# Patient Record
Sex: MALE | Race: WHITE | Employment: FULL TIME | URBAN - METROPOLITAN AREA
[De-identification: names, ages, dates, MRNs, and addresses within clinical notes are randomized per-mention and may not be internally consistent; named-entity substitution may affect disease eponyms.]

---

## 2019-01-05 ENCOUNTER — HOSPITAL ENCOUNTER (EMERGENCY)
Age: 29
Discharge: HOME | End: 2019-01-05
Payer: COMMERCIAL

## 2019-01-05 VITALS
OXYGEN SATURATION: 97 % | RESPIRATION RATE: 16 BRPM | SYSTOLIC BLOOD PRESSURE: 117 MMHG | DIASTOLIC BLOOD PRESSURE: 74 MMHG | HEART RATE: 73 BPM | TEMPERATURE: 98.6 F | WEIGHT: 180 LBS

## 2019-01-05 DIAGNOSIS — J02.9 ACUTE PHARYNGITIS, UNSPECIFIED ETIOLOGY: Primary | ICD-10-CM

## 2019-01-05 PROCEDURE — 99212 OFFICE O/P EST SF 10 MIN: CPT

## 2019-01-05 RX ORDER — AMOXICILLIN 500 MG/1
500 CAPSULE ORAL 3 TIMES DAILY
Qty: 30 CAPSULE | Refills: 0 | Status: SHIPPED | OUTPATIENT
Start: 2019-01-05 | End: 2019-01-15

## 2019-09-23 ENCOUNTER — HOSPITAL ENCOUNTER (EMERGENCY)
Age: 29
Discharge: HOME | End: 2019-09-23
Payer: COMMERCIAL

## 2019-09-23 VITALS
RESPIRATION RATE: 18 BRPM | TEMPERATURE: 98.5 F | BODY MASS INDEX: 24.52 KG/M2 | WEIGHT: 185 LBS | SYSTOLIC BLOOD PRESSURE: 122 MMHG | OXYGEN SATURATION: 98 % | HEART RATE: 61 BPM | DIASTOLIC BLOOD PRESSURE: 78 MMHG | HEIGHT: 73 IN

## 2019-09-23 DIAGNOSIS — J01.90 ACUTE SINUSITIS, RECURRENCE NOT SPECIFIED, UNSPECIFIED LOCATION: Primary | ICD-10-CM

## 2019-09-23 PROCEDURE — 99212 OFFICE O/P EST SF 10 MIN: CPT

## 2019-09-23 RX ORDER — IBUPROFEN 200 MG
800 TABLET ORAL EVERY 6 HOURS PRN
COMMUNITY
End: 2022-08-10

## 2019-09-23 RX ORDER — PSEUDOEPHEDRINE HCL 60 MG/1
60 TABLET ORAL EVERY 6 HOURS PRN
Qty: 20 TABLET | Refills: 0 | Status: SHIPPED | OUTPATIENT
Start: 2019-09-23 | End: 2022-08-10

## 2019-09-23 RX ORDER — AMOXICILLIN AND CLAVULANATE POTASSIUM 875; 125 MG/1; MG/1
1 TABLET, FILM COATED ORAL 2 TIMES DAILY
Qty: 20 TABLET | Refills: 0 | Status: SHIPPED | OUTPATIENT
Start: 2019-09-23 | End: 2019-10-03

## 2019-09-23 ASSESSMENT — PAIN DESCRIPTION - FREQUENCY: FREQUENCY: CONTINUOUS

## 2019-09-23 ASSESSMENT — PAIN DESCRIPTION - ONSET: ONSET: GRADUAL

## 2019-09-23 ASSESSMENT — PAIN DESCRIPTION - ORIENTATION: ORIENTATION: RIGHT;LEFT

## 2019-09-23 ASSESSMENT — PAIN DESCRIPTION - LOCATION: LOCATION: EAR;HEAD

## 2019-09-23 ASSESSMENT — PAIN SCALES - GENERAL: PAINLEVEL_OUTOF10: 3

## 2019-09-23 ASSESSMENT — PAIN DESCRIPTION - PROGRESSION: CLINICAL_PROGRESSION: GRADUALLY WORSENING

## 2019-09-23 ASSESSMENT — PAIN DESCRIPTION - PAIN TYPE: TYPE: ACUTE PAIN

## 2019-12-30 ENCOUNTER — HOSPITAL ENCOUNTER (EMERGENCY)
Age: 29
Discharge: HOME | End: 2019-12-30
Payer: COMMERCIAL

## 2019-12-30 VITALS
HEART RATE: 91 BPM | DIASTOLIC BLOOD PRESSURE: 74 MMHG | TEMPERATURE: 99.2 F | SYSTOLIC BLOOD PRESSURE: 129 MMHG | OXYGEN SATURATION: 97 % | RESPIRATION RATE: 16 BRPM | BODY MASS INDEX: 24.41 KG/M2 | WEIGHT: 185 LBS

## 2019-12-30 DIAGNOSIS — R68.89 FLU-LIKE SYMPTOMS: Primary | ICD-10-CM

## 2019-12-30 LAB — STREP GRP A PCR: NEGATIVE

## 2019-12-30 PROCEDURE — 99212 OFFICE O/P EST SF 10 MIN: CPT

## 2019-12-30 PROCEDURE — 87880 STREP A ASSAY W/OPTIC: CPT

## 2019-12-30 RX ORDER — OSELTAMIVIR PHOSPHATE 75 MG/1
75 CAPSULE ORAL 2 TIMES DAILY
Qty: 10 CAPSULE | Refills: 0 | Status: SHIPPED | OUTPATIENT
Start: 2019-12-30 | End: 2020-01-04

## 2019-12-30 ASSESSMENT — PAIN SCALES - GENERAL: PAINLEVEL_OUTOF10: 2

## 2019-12-30 ASSESSMENT — PAIN DESCRIPTION - LOCATION: LOCATION: GENERALIZED

## 2019-12-30 ASSESSMENT — PAIN DESCRIPTION - ONSET: ONSET: ON-GOING

## 2019-12-30 ASSESSMENT — PAIN - FUNCTIONAL ASSESSMENT: PAIN_FUNCTIONAL_ASSESSMENT: 0-10

## 2019-12-30 ASSESSMENT — PAIN DESCRIPTION - FREQUENCY: FREQUENCY: CONTINUOUS

## 2019-12-30 ASSESSMENT — PAIN DESCRIPTION - DESCRIPTORS: DESCRIPTORS: ACHING

## 2019-12-30 ASSESSMENT — PAIN DESCRIPTION - PROGRESSION: CLINICAL_PROGRESSION: NOT CHANGED

## 2022-08-10 ENCOUNTER — OFFICE VISIT (OUTPATIENT)
Dept: PRIMARY CARE CLINIC | Age: 32
End: 2022-08-10
Payer: COMMERCIAL

## 2022-08-10 VITALS
HEART RATE: 54 BPM | TEMPERATURE: 96.9 F | BODY MASS INDEX: 25.84 KG/M2 | SYSTOLIC BLOOD PRESSURE: 122 MMHG | HEIGHT: 73 IN | OXYGEN SATURATION: 98 % | DIASTOLIC BLOOD PRESSURE: 68 MMHG | WEIGHT: 195 LBS

## 2022-08-10 DIAGNOSIS — Z00.00 NORMAL PHYSICAL EXAM: Primary | ICD-10-CM

## 2022-08-10 PROCEDURE — 99385 PREV VISIT NEW AGE 18-39: CPT | Performed by: INTERNAL MEDICINE

## 2022-08-10 SDOH — ECONOMIC STABILITY: FOOD INSECURITY: WITHIN THE PAST 12 MONTHS, YOU WORRIED THAT YOUR FOOD WOULD RUN OUT BEFORE YOU GOT MONEY TO BUY MORE.: NEVER TRUE

## 2022-08-10 SDOH — ECONOMIC STABILITY: FOOD INSECURITY: WITHIN THE PAST 12 MONTHS, THE FOOD YOU BOUGHT JUST DIDN'T LAST AND YOU DIDN'T HAVE MONEY TO GET MORE.: NEVER TRUE

## 2022-08-10 ASSESSMENT — ENCOUNTER SYMPTOMS
SORE THROAT: 0
SHORTNESS OF BREATH: 0
NAUSEA: 0
COLOR CHANGE: 0
SINUS PAIN: 0
ABDOMINAL PAIN: 0
TROUBLE SWALLOWING: 0
SINUS PRESSURE: 0
FACIAL SWELLING: 0
BLOOD IN STOOL: 0
EYE DISCHARGE: 0
COUGH: 0
DIARRHEA: 0
BACK PAIN: 0
WHEEZING: 0
VOMITING: 0
CONSTIPATION: 0

## 2022-08-10 ASSESSMENT — PATIENT HEALTH QUESTIONNAIRE - PHQ9
2. FEELING DOWN, DEPRESSED OR HOPELESS: 0
SUM OF ALL RESPONSES TO PHQ9 QUESTIONS 1 & 2: 0
SUM OF ALL RESPONSES TO PHQ QUESTIONS 1-9: 0
1. LITTLE INTEREST OR PLEASURE IN DOING THINGS: 0
SUM OF ALL RESPONSES TO PHQ QUESTIONS 1-9: 0

## 2022-08-10 ASSESSMENT — SOCIAL DETERMINANTS OF HEALTH (SDOH): HOW HARD IS IT FOR YOU TO PAY FOR THE VERY BASICS LIKE FOOD, HOUSING, MEDICAL CARE, AND HEATING?: NOT HARD AT ALL

## 2022-08-10 NOTE — PROGRESS NOTES
Chaya Vance (:  1990) is a 28 y.o. male,New patient, here for evaluation of the following chief complaint(s):  New Patient      Subjective  physical exam  SUBJECTIVE/OBJECTIVE:  HPI:    Review of Systems   Constitutional:  Negative for activity change, appetite change, fever and unexpected weight change. HENT:  Negative for congestion, ear discharge, facial swelling, nosebleeds, postnasal drip, sinus pressure, sinus pain, sore throat and trouble swallowing. Eyes:  Negative for discharge. Respiratory:  Negative for cough, shortness of breath and wheezing. Cardiovascular:  Negative for chest pain, palpitations and leg swelling. Gastrointestinal:  Negative for abdominal pain, blood in stool, constipation, diarrhea, nausea and vomiting. Endocrine: Negative for cold intolerance and polydipsia. Genitourinary:  Negative for dysuria, frequency, hematuria and testicular pain. Musculoskeletal:  Negative for arthralgias, back pain, gait problem, joint swelling and myalgias. Skin:  Negative for color change, rash and wound. Allergic/Immunologic: Negative for environmental allergies. Neurological:  Negative for tremors, syncope, facial asymmetry, speech difficulty, weakness, numbness and headaches. Hematological:  Negative for adenopathy. Does not bruise/bleed easily. Psychiatric/Behavioral:  Negative for behavioral problems, confusion, dysphoric mood, sleep disturbance and suicidal ideas. The patient is not nervous/anxious. Objective   /68   Pulse 54   Temp 96.9 °F (36.1 °C) (Temporal)   Ht 6' 1\" (1.854 m)   Wt 195 lb (88.5 kg)   SpO2 98%   BMI 25.73 kg/m²   No current outpatient medications on file. No current facility-administered medications for this visit. Allergies   Allergen Reactions    Suprax [Cefixime]         History reviewed. No pertinent past medical history.   Past Surgical History:   Procedure Laterality Date    VASECTOMY       Family History   Problem Relation Age of Onset    Heart Disease Maternal Grandfather     Heart Disease Paternal Grandfather       Social History     Socioeconomic History    Marital status:      Spouse name: Not on file    Number of children: Not on file    Years of education: Not on file    Highest education level: Not on file   Occupational History    Not on file   Tobacco Use    Smoking status: Never    Smokeless tobacco: Never   Substance and Sexual Activity    Alcohol use: No    Drug use: No    Sexual activity: Never   Other Topics Concern    Not on file   Social History Narrative    Not on file     Social Determinants of Health     Financial Resource Strain: Low Risk     Difficulty of Paying Living Expenses: Not hard at all   Food Insecurity: No Food Insecurity    Worried About Running Out of Food in the Last Year: Never true    Ran Out of Food in the Last Year: Never true   Transportation Needs: Not on file   Physical Activity: Not on file   Stress: Not on file   Social Connections: Not on file   Intimate Partner Violence: Not on file   Housing Stability: Not on file      Health Maintenance Due   Topic Date Due    Varicella vaccine (1 of 2 - 2-dose childhood series) Never done    HIV screen  Never done    Hepatitis C screen  Never done    COVID-19 Vaccine (2 - Booster for [place] series) 10/05/2021        Physical Exam  Vitals and nursing note reviewed. Constitutional:       Appearance: Normal appearance. He is normal weight. HENT:      Head: Normocephalic and atraumatic. Right Ear: Tympanic membrane, ear canal and external ear normal.      Left Ear: Tympanic membrane, ear canal and external ear normal.      Nose: Nose normal.      Mouth/Throat:      Mouth: Mucous membranes are dry. Pharynx: Oropharynx is clear. Eyes:      Conjunctiva/sclera: Conjunctivae normal.      Pupils: Pupils are equal, round, and reactive to light. Cardiovascular:      Rate and Rhythm: Normal rate and regular rhythm. Pulses: Normal pulses. Heart sounds: Normal heart sounds. Pulmonary:      Effort: Pulmonary effort is normal.      Breath sounds: Normal breath sounds. Abdominal:      General: Abdomen is flat. Bowel sounds are normal.      Palpations: Abdomen is soft. Genitourinary:     Comments: deferred  Musculoskeletal:         General: Normal range of motion. Right shoulder: Normal.      Left shoulder: Normal.      Right upper arm: Normal.      Left upper arm: Normal.      Right elbow: Normal.      Left elbow: Normal.      Right forearm: Normal.      Left forearm: Normal.      Right wrist: Normal.      Left wrist: Normal.      Right hand: Normal.      Left hand: Normal.      Cervical back: Normal, normal range of motion and neck supple. Thoracic back: Normal.      Lumbar back: Normal.   Skin:     General: Skin is warm and dry. Neurological:      General: No focal deficit present. Mental Status: He is alert and oriented to person, place, and time. Mental status is at baseline. Psychiatric:         Mood and Affect: Mood normal.         Behavior: Behavior normal.         Thought Content: Thought content normal.         Judgment: Judgment normal.             ASSESSMENT/PLAN:  1. Normal physical exam    No follow-ups on file. An electronic signature was used to authenticate this note.     --Ariadne Carmichael MD

## 2022-08-15 DIAGNOSIS — Z00.00 NORMAL PHYSICAL EXAM: ICD-10-CM

## 2022-08-15 LAB
ALBUMIN SERPL-MCNC: 4.9 G/DL (ref 3.5–5.2)
ALP BLD-CCNC: 50 U/L (ref 40–129)
ALT SERPL-CCNC: 21 U/L (ref 0–40)
ANION GAP SERPL CALCULATED.3IONS-SCNC: 14 MMOL/L (ref 7–16)
AST SERPL-CCNC: 70 U/L (ref 0–39)
BASOPHILS ABSOLUTE: 0.04 E9/L (ref 0–0.2)
BASOPHILS RELATIVE PERCENT: 0.8 % (ref 0–2)
BILIRUB SERPL-MCNC: 0.7 MG/DL (ref 0–1.2)
BUN BLDV-MCNC: 18 MG/DL (ref 6–20)
CALCIUM SERPL-MCNC: 9.8 MG/DL (ref 8.6–10.2)
CHLORIDE BLD-SCNC: 104 MMOL/L (ref 98–107)
CHOLESTEROL, FASTING: 189 MG/DL (ref 0–199)
CO2: 24 MMOL/L (ref 22–29)
CREAT SERPL-MCNC: 1 MG/DL (ref 0.7–1.2)
EOSINOPHILS ABSOLUTE: 0.09 E9/L (ref 0.05–0.5)
EOSINOPHILS RELATIVE PERCENT: 1.8 % (ref 0–6)
GFR AFRICAN AMERICAN: >60
GFR NON-AFRICAN AMERICAN: >60 ML/MIN/1.73
GLUCOSE BLD-MCNC: 87 MG/DL (ref 74–99)
HCT VFR BLD CALC: 43.9 % (ref 37–54)
HDLC SERPL-MCNC: 68 MG/DL
HEMOGLOBIN: 14 G/DL (ref 12.5–16.5)
IMMATURE GRANULOCYTES #: 0.01 E9/L
IMMATURE GRANULOCYTES %: 0.2 % (ref 0–5)
LDL CHOLESTEROL CALCULATED: 111 MG/DL (ref 0–99)
LYMPHOCYTES ABSOLUTE: 1.98 E9/L (ref 1.5–4)
LYMPHOCYTES RELATIVE PERCENT: 38.8 % (ref 20–42)
MCH RBC QN AUTO: 30.2 PG (ref 26–35)
MCHC RBC AUTO-ENTMCNC: 31.9 % (ref 32–34.5)
MCV RBC AUTO: 94.6 FL (ref 80–99.9)
MONOCYTES ABSOLUTE: 0.31 E9/L (ref 0.1–0.95)
MONOCYTES RELATIVE PERCENT: 6.1 % (ref 2–12)
NEUTROPHILS ABSOLUTE: 2.67 E9/L (ref 1.8–7.3)
NEUTROPHILS RELATIVE PERCENT: 52.3 % (ref 43–80)
PDW BLD-RTO: 13 FL (ref 11.5–15)
PLATELET # BLD: 213 E9/L (ref 130–450)
PMV BLD AUTO: 11.6 FL (ref 7–12)
POTASSIUM SERPL-SCNC: 5.3 MMOL/L (ref 3.5–5)
RBC # BLD: 4.64 E12/L (ref 3.8–5.8)
SODIUM BLD-SCNC: 142 MMOL/L (ref 132–146)
T4 FREE: 1.31 NG/DL (ref 0.93–1.7)
TOTAL PROTEIN: 7.3 G/DL (ref 6.4–8.3)
TRIGLYCERIDE, FASTING: 52 MG/DL (ref 0–149)
TSH SERPL DL<=0.05 MIU/L-ACNC: 2.34 UIU/ML (ref 0.27–4.2)
VLDLC SERPL CALC-MCNC: 10 MG/DL
WBC # BLD: 5.1 E9/L (ref 4.5–11.5)

## 2023-08-17 ENCOUNTER — HOSPITAL ENCOUNTER (EMERGENCY)
Age: 33
Discharge: HOME | End: 2023-08-17
Payer: COMMERCIAL

## 2023-08-17 VITALS
BODY MASS INDEX: 25.18 KG/M2 | HEIGHT: 73 IN | HEART RATE: 55 BPM | WEIGHT: 190 LBS | OXYGEN SATURATION: 100 % | RESPIRATION RATE: 18 BRPM | TEMPERATURE: 98.2 F | SYSTOLIC BLOOD PRESSURE: 128 MMHG | DIASTOLIC BLOOD PRESSURE: 93 MMHG

## 2023-08-17 DIAGNOSIS — J02.9 SORE THROAT: Primary | ICD-10-CM

## 2023-08-17 LAB
SPECIMEN SOURCE: NORMAL
STREP A, MOLECULAR: NEGATIVE

## 2023-08-17 PROCEDURE — 99211 OFF/OP EST MAY X REQ PHY/QHP: CPT

## 2023-08-17 RX ORDER — PREDNISONE 20 MG/1
TABLET ORAL
Qty: 6 TABLET | Refills: 0 | Status: SHIPPED | OUTPATIENT
Start: 2023-08-17

## 2023-08-17 ASSESSMENT — PAIN SCALES - GENERAL: PAINLEVEL_OUTOF10: 0

## 2023-08-17 ASSESSMENT — PAIN - FUNCTIONAL ASSESSMENT: PAIN_FUNCTIONAL_ASSESSMENT: 0-10

## 2023-08-17 NOTE — ED PROVIDER NOTES
Strep Screen    Specimen: Throat   Result Value Ref Range    Source . THROAT SWAB     Strep A, Molecular NEGATIVE NEGATIVE     No orders to display       Labs Reviewed   RAPID STREP SCREEN       When ordered only abnormal lab results are displayed. All other labs were within normal range or not returned as of this dictation. Interpretation per the Radiologist below, if available at the time of this note:    No orders to display     No results found. No results found.     -------------------------------------------------PROCEDURES--------------------------------------------  Unless otherwise noted below, none      Procedures      ---EMERGENCY DEPARTMENT COURSE and DIFFERENTIAL DIAGNOSIS/MDM---  (CC/HPI Summary, DDx, ED Course, and Reassessment:) (Disposition Considerations (include 1 Tests not done, Admit vs D/C, Shared Decision Making, Pt Expectation of Test or Tx., Consults, Social Determinants, Chronic Conditiions, Records reviewed)    MDM  Number of Diagnoses or Management Options  Sore throat  Diagnosis management comments: Patient is in no acute distress. Strep screen was negative. He does have some mild erythema and swelling to the oropharynx but the oropharynx is patent. He is afebrile here heart rate is not tachycardic. He does not look septic. Probable viral or environmental cause for this problem. He looks well here. He can take Tylenol or ibuprofen for fever or pain. I will add a steroid regimen for the next several days to help with the pain and swelling in the throat. Instructions given. DISCHARGE MEDICATIONS:  New Prescriptions    PREDNISONE (DELTASONE) 20 MG TABLET    Take 2 tablets by mouth daily x 3 days.        DISCONTINUED MEDICATIONS:  Discontinued Medications    No medications on file       PATIENT REFERRED TO:  Elham Fisher MD  [address] [phone]    Schedule an appointment as soon as possible for a visit